# Patient Record
Sex: FEMALE | Race: OTHER
[De-identification: names, ages, dates, MRNs, and addresses within clinical notes are randomized per-mention and may not be internally consistent; named-entity substitution may affect disease eponyms.]

---

## 2018-02-20 ENCOUNTER — HOSPITAL ENCOUNTER (EMERGENCY)
Dept: HOSPITAL 63 - ER | Age: 58
Discharge: HOME | End: 2018-02-20
Payer: COMMERCIAL

## 2018-02-20 VITALS
DIASTOLIC BLOOD PRESSURE: 90 MMHG | SYSTOLIC BLOOD PRESSURE: 140 MMHG | SYSTOLIC BLOOD PRESSURE: 140 MMHG | DIASTOLIC BLOOD PRESSURE: 90 MMHG | DIASTOLIC BLOOD PRESSURE: 90 MMHG | SYSTOLIC BLOOD PRESSURE: 140 MMHG | DIASTOLIC BLOOD PRESSURE: 90 MMHG | SYSTOLIC BLOOD PRESSURE: 140 MMHG

## 2018-02-20 VITALS — WEIGHT: 160 LBS | BODY MASS INDEX: 27.31 KG/M2 | HEIGHT: 64 IN

## 2018-02-20 DIAGNOSIS — S60.221A: Primary | ICD-10-CM
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
## 2018-02-20 DIAGNOSIS — M79.7: ICD-10-CM
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
## 2018-02-20 DIAGNOSIS — E11.9: ICD-10-CM
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
## 2018-02-20 DIAGNOSIS — I10: ICD-10-CM
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
## 2018-02-20 DIAGNOSIS — E78.00: ICD-10-CM

## 2018-02-20 PROCEDURE — 99284 EMERGENCY DEPT VISIT MOD MDM: CPT

## 2018-02-20 PROCEDURE — 73130 X-RAY EXAM OF HAND: CPT

## 2018-02-20 NOTE — RAD
Right hand, 3 views, 2/20/2018:



History: Fall, pain



No fracture or dislocation is identified.  The soft tissues are unremarkable.



IMPRESSION: No acute right hand abnormality is detected.

## 2019-02-06 NOTE — PHYS DOC
C/o sore throat for 2 days, hurts to swallow. Past History


Past Medical History:  Diabetes, Fibromyalgia, High Cholesterol, Hypertension


Past Surgical History:  Cholecystectomy, Hysterectomy, Other


Alcohol Use:  None


Drug Use:  None





Adult General


Chief Complaint


Chief Complaint:  WRIST PAIN





HPI


HPI





Patient is a 57 year old female who presents with right hand pain. She states 

she fell off her chair when she's reaching for pen on Friday. She states she's 

been having some pain and discomfort especially when she started type. She 

denies any other injuries.





Review of Systems


Review of Systems





Constitutional: Denies fever or chills []


Eyes: Denies change in visual acuity, redness, or eye pain []


HENT: Denies nasal congestion or sore throat []


Respiratory: Denies cough or shortness of breath []


Cardiovascular: No additional information not addressed in HPI []


GI: Denies abdominal pain, nausea, vomiting, bloody stools or diarrhea []


: Denies dysuria or hematuria []


Musculoskeletal: Denies back pain, positive for right hand pain


Integument: Denies rash or skin lesions []


Neurologic: Denies headache, focal weakness or sensory changes []


Endocrine: Denies polyuria or polydipsia []





All other systems were reviewed and found to be within normal limits, except as 

documented in this note.





Physical Exam


Physical Exam





Constitutional: Well developed, well nourished, no acute distress, non-toxic 

appearance. []


HENT: Normocephalic, atraumatic, bilateral external ears normal, oropharynx 

moist, no oral exudates, nose normal. []


Eyes: PERRLA, EOMI, conjunctiva normal, no discharge. [] 


Neck: Normal range of motion, no tenderness, supple, no stridor. [] 


Cardiovascular:Heart rate regular rhythm, no murmur []


Lungs & Thorax:  Bilateral breath sounds clear to auscultation []


Abdomen: Bowel sounds normal, soft, no tenderness, no masses, no pulsatile 

masses. [] 


Skin: Warm, dry, no erythema, no rash. [] 


Back: No tenderness, no CVA tenderness. [] 


Extremities: Mild tender palpation over the right hand from metacarpals 1 

through 5, no wrist tenderness or snuffbox tenderness appreciated, sensation 

intact to light touch and motor is intact to flexion and extension's at wrist 

and MCP PIP DIP joints to radial ulnar and median nerve distributions, no 

swelling, ring on the fourth digit is loose, No cyanosis, no clubbing, ROM 

intact, no edema. [] 


Neurologic: Alert and oriented X 3, normal motor function, normal sensory 

function, no focal deficits noted. []


Psychologic: Affect normal, judgement normal, mood normal. []





Current Patient Data


Vital Signs





 Vital Signs








  Date Time  Temp Pulse Resp B/P (MAP) Pulse Ox O2 Delivery O2 Flow Rate FiO2


 


18 12:44 98.3 88 20  98 Room Air  











EKG


EKG


[]





Radiology/Procedures


Radiology/Procedures


 [facility] [address], [place], [place] [address] [phone]


 


 IMAGING REPORT





 Signed





PATIENT: KAILA ACCOUNT: [id number] MRN#: [id number]


: 1960 LOCATION: ER AGE: 57


SEX: F EXAM DT: 18 ACCESSION#: [id number]


STATUS: REG ER ORD. PHYSICIAN: NICKO MOBLEY MD 


REASON: pain after fall


PROCEDURE: HAND RIGHT 3V





Right hand, 3 views, 2018:





History: Fall, pain





No fracture or dislocation is identified.  The soft tissues are unremarkable.





IMPRESSION: No acute right hand abnormality is detected.














DICTATED AND SIGNED BY:     MORITZ,RICK S MD


DATE:     18 1338





CC: NICKO MOBLEY MD; ANTHONY ANDUJAR MD ~


Impressions:


Hand contusion





Course & Med Decision Making


Course & Med Decision Making


Pertinent Labs and Imaging studies reviewed. (See chart for details)





3 views of the right hand did not show any fractures or other abnormalities. 

Patient's being discharged from the ER and can use Epsom salt soaks, ice or 

heat to help with discomfort. Return precautions given for increasing pain 

fevers swelling or other concerns.





Dragon Disclaimer


Dragon Disclaimer


This electronic medical record was generated, in whole or in part, using a 

voice recognition dictation system.





Departure


Departure:


Impression:  


 Primary Impression:  


 Hand contusion


Disposition:  01 HOME, SELF-CARE


Condition:  STABLE


Referrals:  


ANTHONY ANDUJAR MD (PCP)


Patient Instructions:  Hand Contusion





Additional Instructions:  


X-rays do not show anything broken. You do not have any pain in your wrist and 

therefore at this time you need a splint. You can use ice or heat or Epsom 

salts soaks which every prefer for pain and discomfort. If your pain persists 

over the next week, you notice any swelling, your fingers turn blue purple or 

you have severe pain please return back to the ER. You should follow-up to 

primary care physician within the week.





Problem Qualifiers








 Primary Impression:  


 Hand contusion


 Encounter type:  initial encounter  Laterality:  right  Qualified Codes:  

S60.221A - Contusion of right hand, initial encounter








NICKO MOBLEY MD 2018 12:58

## 2020-09-19 ENCOUNTER — HOSPITAL ENCOUNTER (OUTPATIENT)
Dept: HOSPITAL 63 - LAB | Age: 60
End: 2020-09-19
Payer: COMMERCIAL

## 2020-09-19 DIAGNOSIS — U07.1: Primary | ICD-10-CM

## 2020-09-30 ENCOUNTER — HOSPITAL ENCOUNTER (OUTPATIENT)
Dept: HOSPITAL 63 - LAB | Age: 60
Discharge: HOME | End: 2020-09-30
Payer: COMMERCIAL

## 2020-09-30 DIAGNOSIS — R53.81: Primary | ICD-10-CM

## 2020-09-30 DIAGNOSIS — Z20.828: ICD-10-CM

## 2020-10-01 ENCOUNTER — HOSPITAL ENCOUNTER (OUTPATIENT)
Dept: HOSPITAL 63 - LAB | Age: 60
Discharge: HOME | End: 2020-10-01
Payer: COMMERCIAL

## 2020-10-01 DIAGNOSIS — Z20.828: ICD-10-CM

## 2020-10-01 DIAGNOSIS — R53.81: Primary | ICD-10-CM
